# Patient Record
Sex: FEMALE | Race: WHITE | NOT HISPANIC OR LATINO | URBAN - NONMETROPOLITAN AREA
[De-identification: names, ages, dates, MRNs, and addresses within clinical notes are randomized per-mention and may not be internally consistent; named-entity substitution may affect disease eponyms.]

---

## 2018-04-20 PROBLEM — H01.024: Noted: 2018-10-22

## 2018-04-20 PROBLEM — H04.123: Noted: 2018-04-20

## 2018-04-20 PROBLEM — H01.021: Noted: 2018-10-22

## 2018-04-20 PROBLEM — H40.023: Noted: 2018-04-20

## 2018-04-20 PROBLEM — H43.811: Noted: 2018-04-20

## 2018-04-20 PROBLEM — Z96.1: Noted: 2018-04-20

## 2018-04-20 PROBLEM — E11.9: Noted: 2018-10-22

## 2019-04-22 ENCOUNTER — IMPORTED ENCOUNTER (OUTPATIENT)
Dept: URBAN - NONMETROPOLITAN AREA CLINIC 1 | Facility: CLINIC | Age: 74
End: 2019-04-22

## 2019-04-22 PROCEDURE — 92083 EXTENDED VISUAL FIELD XM: CPT

## 2019-04-22 PROCEDURE — 92014 COMPRE OPH EXAM EST PT 1/>: CPT

## 2019-04-22 NOTE — PATIENT DISCUSSION
Glaucoma suspect OU- Discussed findings of exam in detail with the patient. - IOP stable at 12 OU- Cup to Disc noted at 0.7 OU stable. [facility] previously done:   . 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 Family Hx of ARMD- Discussed diagnosis in detail with patient- Patient is stable at this time- No signs of ARMD seen on todays DFE exam- Patient may continue taking vitamins even though there is no proof that they will help with prevention- Continue to monitor Pseudophakia OU- Discussed exam findings with pt- s/p YAG PC OU- Continue to monitor Type II DM dx 2011- Discussed diagnosis in detail with patient- Stressed importance of good blood sugar control - Controlled with oral medication and diet. - Recommend no sodas- No diabetic retinopathy seen on todays exam- Letter to Dr. Yuridia Das- Continue to monitor PVD OD:  - Discussed findings of exam in detail with the patient. - Patient complaining of flashes of light off and on for the past several days. - Dilated exam showed no holes tears or detachments (flat 360). - The risk of retinal detachment in patients with PVDs was discussed with the patient and the warning signs of retinal detachment were carefully reviewed with the patient. - The patient was warned to return to the office or contact the ophthalmologist on call immediately if they experience signs of retinal detachment or changes in vision noted from today. - Continue to monitor closely. BECKI OU- Discussed diagnosis in detail with patient- Discussed signs and symptoms of progression- Recommend patient drinking plenty of water and starting Omega 3’s - Recommend Refresh or Systane  throughout the day- Consider Restasis or plugs in the future if no improvement- Continue to monitorOcular Allergies- Discussed diagnosis in detail with patient- Signs/symptoms associated discussed- Recommend Zaditor or Alaway OTC - Continue to monitor Blepharitis OU - Discussed diagnosis in detail with patient- Recommend patient using J & J baby shampoo to scrub lid daily- Continue to monitor; 's Notes: MR  4/22/19DFE  8/18/17OCT 10/22/18Optos 8/17/17VF 4/22/19Gonio 4/20/18PACH

## 2019-10-23 ENCOUNTER — IMPORTED ENCOUNTER (OUTPATIENT)
Dept: URBAN - NONMETROPOLITAN AREA CLINIC 1 | Facility: CLINIC | Age: 74
End: 2019-10-23

## 2019-10-23 PROCEDURE — 92014 COMPRE OPH EXAM EST PT 1/>: CPT

## 2019-10-23 PROCEDURE — 92133 CPTRZD OPH DX IMG PST SGM ON: CPT

## 2019-10-23 NOTE — PATIENT DISCUSSION
Glaucoma suspect OU- Discussed findings of exam in detail with the patient. - IOP stable at 15 OU - Cup to Disc noted at 0.7 OU stable. [facility] previously done:   . - VF done perviously OD shows Good field with imcomplete Quadranopsia from      stroke previously  - Gonio done previously: grade lV with light pigment OU- OCT done today shows no NFL thinning OU normal but OS shows small artifact  - Continue to monitor every 6 months or PRN. Family Hx of ARMD- Discussed diagnosis in detail with patient- Patient is stable at this time- No signs of ARMD seen on todays DFE exam- Patient may continue taking vitamins even though there is no proof that they will help with prevention- Continue to monitor Pseudophakia OU- Discussed exam findings with pt- s/p YAG PC OU- Continue to monitor Type II DM dx 2011- Discussed diagnosis in detail with patient- Stressed importance of good blood sugar control - Controlled with oral medication and diet. - Recommend no sodas- No diabetic retinopathy seen on todays exam-  Last A1C was 5.6 - Letter to Dr. Yaron Manrique- Continue to monitor PVD OD:  - Discussed findings of exam in detail with the patient. - Patient complaining of flashes of light off and on for the past several days. - Dilated exam showed no holes tears or detachments (flat 360). - The risk of retinal detachment in patients with PVDs was discussed with the patient and the warning signs of retinal detachment were carefully reviewed with the patient. - The patient was warned to return to the office or contact the ophthalmologist on call immediately if they experience signs of retinal detachment or changes in vision noted from today. - Continue to monitor closely. BECKI OU- Discussed diagnosis in detail with patient- Discussed signs and symptoms of progression- Recommend patient drinking plenty of water and starting Omega 3’s - Recommend Refresh or Systane  throughout the day- Consider Restasis or plugs in the future if no improvement- Continue to monitorOcular Allergies- Discussed diagnosis in detail with patient- Signs/symptoms associated discussed- Recommend Zaditor or Alaway OTC - Continue to monitor Blepharitis OU - Discussed diagnosis in detail with patient- Recommend patient using J & J baby shampoo to scrub lid daily- Continue to monitor; 's Notes: MR  4/22/19DFE  8/18/17OCT 10/23/19 Optos 8/17/17VF 4/22/19Gonio 4/20/18PACH

## 2020-04-22 ENCOUNTER — IMPORTED ENCOUNTER (OUTPATIENT)
Dept: URBAN - NONMETROPOLITAN AREA CLINIC 1 | Facility: CLINIC | Age: 75
End: 2020-04-22

## 2020-04-22 PROCEDURE — 92250 FUNDUS PHOTOGRAPHY W/I&R: CPT

## 2020-04-22 PROCEDURE — 99213 OFFICE O/P EST LOW 20 MIN: CPT

## 2020-04-22 NOTE — PATIENT DISCUSSION
Glaucoma suspect OU- Discussed findings of exam in detail with the patient. - IOP OD 16 and OS 15- Cup to Disc noted at 0.7 OU stable. [facility] previously done:   . 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 Family Hx of ARMD- Discussed diagnosis in detail with patient- Patient is stable at this time- No signs of ARMD seen on todays DFE exam- Patient may continue taking vitamins even though there is no proof that they will help with prevention- Continue to monitor Pseudophakia OU- Discussed exam findings with pt- s/p YAG PC OU- Continue to monitor Type II DM dx 2011- Discussed diagnosis in detail with patient- Stressed importance of good blood sugar control - Controlled with oral medication and diet. - Recommend no sodas- No diabetic retinopathy seen on todays exam-  Last A1C was 5.6 - Letter to Dr. Chaudhry Ask- Continue to monitor PVD OD:  - Discussed findings of exam in detail with the patient. - Patient complaining of flashes of light off and on for the past several days. - Dilated exam showed no holes tears or detachments (flat 360). - The risk of retinal detachment in patients with PVDs was discussed with the patient and the warning signs of retinal detachment were carefully reviewed with the patient. - The patient was warned to return to the office or contact the ophthalmologist on call immediately if they experience signs of retinal detachment or changes in vision noted from today. - Continue to monitor closely. BECKI OU- Discussed diagnosis in detail with patient- Discussed signs and symptoms of progression- Recommend patient drinking plenty of water and starting Omega 3’s - Recommend Refresh or Systane  throughout the day- Consider Restasis or plugs in the future if no improvement- Continue to monitorOcular Allergies- Discussed diagnosis in detail with patient- Signs/symptoms associated discussed- Recommend Pataday OTC- Patient has tried Zaditor and Alaway but didnt seen to help - Continue to monitor Blepharitis OU - Discussed diagnosis in detail with patient- Recommend patient using J & J baby shampoo to scrub lid daily- Continue to monitor; 's Notes: MR  4/22/19DFE  8/18/17OCT 10/23/19 Optos 4/22/20VF 4/22/19Gonio 4/20/18PACH

## 2020-10-22 ENCOUNTER — IMPORTED ENCOUNTER (OUTPATIENT)
Dept: URBAN - NONMETROPOLITAN AREA CLINIC 1 | Facility: CLINIC | Age: 75
End: 2020-10-22

## 2020-10-22 PROCEDURE — 92083 EXTENDED VISUAL FIELD XM: CPT

## 2020-10-22 PROCEDURE — 92014 COMPRE OPH EXAM EST PT 1/>: CPT

## 2020-10-22 NOTE — PATIENT DISCUSSION
Glaucoma suspect OU- Discussed findings of exam in detail with the patient. - IOP 17 OU- Cup to Disc noted at 0.7 OU stable. [facility] previously done:   . 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   - Gonio done previously: grade lV with light pigment OU- OCT done previously shows no NFL thinning OU normal but OS shows small artifact- Optos done previolusy stable OU   - Continue to monitor  Family Hx of ARMD- Discussed diagnosis in detail with patient- No signs of ARMD seen on todays DFE exam- Patient may continue taking vitamins even though there is no proof that they will help with prevention- Continue to monitor Pseudophakia OU- Discussed exam findings with pt- s/p YAG PC OU- Continue to monitor Type II DM dx 2011- Discussed diagnosis in detail with patient- Stressed importance of good blood sugar control - Controlled with oral medication and diet. - Recommend no sodas- No diabetic retinopathy seen on todays exam-  Last A1C was 5.6 - Letter to Dr. Yuridia Das- Continue to monitor PVD OD:  - Discussed findings of exam in detail with the patient. - Patient complaining of flashes of light off and on for the past several days. - Dilated exam showed no holes tears or detachments (flat 360). - The risk of retinal detachment in patients with PVDs was discussed with the patient and the warning signs of retinal detachment were carefully reviewed with the patient. - The patient was warned to return to the office or contact the ophthalmologist on call immediately if they experience signs of retinal detachment or changes in vision noted from today. - Continue to monitor closely. BECKI OU- Discussed diagnosis in detail with patient- Discussed signs and symptoms of progression- Recommend patient drinking plenty of water and starting Omega 3’s - Recommend Refresh or Systane  throughout the day- Consider Restasis or plugs in the future if no improvement- Continue to monitorOcular Allergies- Discussed diagnosis in detail with patient- Signs/symptoms associated discussed- Recommend Pataday OTC- Patient has tried Zaditor and Alaway but didnt seen to help - Samples of Bepreve and Zerviate given today - Continue to monitor Blepharitis OU - Discussed diagnosis in detail with patient- Recommend patient using J & J baby shampoo to scrub lid daily- Continue to monitor; 's Notes: MR  4/22/19DFE  8/18/17OCT 10/23/19 Optos 4/22/20VF 4/22/19Gonio 4/20/18PACH

## 2021-05-10 ENCOUNTER — IMPORTED ENCOUNTER (OUTPATIENT)
Dept: URBAN - NONMETROPOLITAN AREA CLINIC 1 | Facility: CLINIC | Age: 76
End: 2021-05-10

## 2021-05-10 ENCOUNTER — PREPPED CHART (OUTPATIENT)
Dept: URBAN - NONMETROPOLITAN AREA CLINIC 1 | Facility: CLINIC | Age: 76
End: 2021-05-10

## 2021-05-10 PROCEDURE — 99214 OFFICE O/P EST MOD 30 MIN: CPT

## 2021-05-10 PROCEDURE — 92015 DETERMINE REFRACTIVE STATE: CPT

## 2021-05-10 NOTE — PATIENT DISCUSSION
Discussed diagnosis in detail with patient. S/P YAG PC OU good central opening OU. Will continue to monitor.

## 2021-05-10 NOTE — PATIENT DISCUSSION
Discussed diagnosis in detail with patient. IOP 15 OU. Cup to Disc noted at .7 OU. PACHY done previously  and . VF done previously shows Bilateral Incomplete Superior Quadranopsia. Gonio done previously grade IV with light pigment OU. OCT done today Shows No NFL thinning OU. Optos done previously stable OU. Continue to monitor.

## 2021-05-10 NOTE — PATIENT DISCUSSION
Discussed diagnosis in detail with patient. Diagnosed since 2011. Stressed importance of good blood sugar control. Controlled with oral medication. Recommend no sodas. Patient reorts last A1C was 5.5 . No diabetic retinopathy seen on today's exam.  Notes to Dr. Jahaira Soto. Continue to monitor.

## 2021-05-10 NOTE — PATIENT DISCUSSION
Glaucoma suspect OU- Discussed findings of exam in detail with the patient. - IOP 15 OU- Cup to Disc noted at 0.7 OU stable. [facility] previously done:   . 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   - Gonio done previously: grade lV with light pigment OU- OCT done today shows no NFL thinning OU- Optos done previolusy stable OU   - Continue to monitor  Family Hx of ARMD- Discussed diagnosis in detail with patient- No signs of ARMD seen on todays DFE exam- Patient may continue taking vitamins even though there is no proof that they will help with prevention- Continue to monitor Pseudophakia OU- Discussed exam findings with pt- s/p YAG PC OU- Continue to monitor Type II DM dx 2011- Discussed diagnosis in detail with patient- Stressed importance of good blood sugar control - Controlled with oral medication and diet. - Recommend no sodas- No diabetic retinopathy seen on todays exam-  Last A1C was 5.5- Letter to Dr. Anish Garcia- Continue to monitor PVD OD:  - Discussed findings of exam in detail with the patient. - Patient complaining of flashes of light off and on for the past several days. - Dilated exam showed no holes tears or detachments (flat 360). - The risk of retinal detachment in patients with PVDs was discussed with the patient and the warning signs of retinal detachment were carefully reviewed with the patient. - The patient was warned to return to the office or contact the ophthalmologist on call immediately if they experience signs of retinal detachment or changes in vision noted from today. - Continue to monitor closely. BECKI OU- Discussed diagnosis in detail with patient- Discussed signs and symptoms of progression- Recommend patient drinking plenty of water and starting Omega 3’s - Recommend Refresh or Systane  throughout the day- Consider Restasis or plugs in the future if no improvement- Continue to monitorOcular Allergies- Discussed diagnosis in detail with patient- Signs/symptoms associated discussed- Recommend Pataday OTC- Patient has tried Zaditor and Alaway but didnt seen to help - Samples of Bepreve and Zerviate given today - Continue to monitor Blepharitis OU - Discussed diagnosis in detail with patient- Recommend patient using J & J baby shampoo to scrub lid daily- Continue to monitorPresbyopia OU - Discussed diagnosis in detail with patient - New glasses RX given today- Continue to monitor; Dr's Notes: MR  5/10/21DFE  8/18/17OCT 5/10/21Optos 4/22/20VF 4/22/19[place]o 4/20/18PACH

## 2021-05-10 NOTE — PATIENT DISCUSSION
Patient complaining of flashes of light on and off for the past several of days. (Without Tear) No holes, tears or detachments. Patient cautioned to call our office immediately if they experience a substantial change in their symptoms such as an increase in floaters, persistent flashes, loss of visual field (may appear as a shadow or a curtain) or decrease in visual acuity as these may indicate a retinal tear or detachment.

## 2021-05-10 NOTE — PATIENT DISCUSSION
Discussed diagnosis in detail with patient. Recommend drinking plenty of water and taking Omega 3's. Recommend Refresh or Syastane through out the day. Consider Restasis or plugs in the future if no improvement. Recommend J&J baby shampoo to scub lids daily. Continue to monitor.

## 2022-01-23 ASSESSMENT — VISUAL ACUITY
OS_CC: 20/30
OD_CC: 20/30

## 2022-01-23 ASSESSMENT — TONOMETRY
OD_IOP_MMHG: 15
OS_IOP_MMHG: 15

## 2022-01-27 ENCOUNTER — FOLLOW UP (OUTPATIENT)
Dept: URBAN - NONMETROPOLITAN AREA CLINIC 1 | Facility: CLINIC | Age: 77
End: 2022-01-27

## 2022-01-27 DIAGNOSIS — H40.023: ICD-10-CM

## 2022-01-27 PROCEDURE — 99213 OFFICE O/P EST LOW 20 MIN: CPT

## 2022-01-27 ASSESSMENT — TONOMETRY
OS_IOP_MMHG: 16
OD_IOP_MMHG: 16

## 2022-01-27 ASSESSMENT — VISUAL ACUITY
OS_SC: 20/25
OD_SC: 20/30-1

## 2022-01-27 NOTE — PATIENT DISCUSSION
Discussed diagnosis in detail with patient. Diagnosed since 2011. Stressed importance of good blood sugar control. Controlled with oral medication. Recommend no sodas. No diabetic retinopathy seen on today's exam.  Notes to Dr. Barfield Left. Continue to monitor.

## 2022-04-09 ASSESSMENT — PACHYMETRY
OD_CT_UM: 574; ADJ: WNL
OS_CT_UM: 563; ADJ: WNL
OD_CT_UM: 574; ADJ: WNL
OS_CT_UM: 563; ADJ: WNL
OS_CT_UM: 563; ADJ: WNL
OD_CT_UM: 574; ADJ: WNL
OS_CT_UM: 563; ADJ: WNL
OS_CT_UM: 563; ADJ: WNL

## 2022-04-09 ASSESSMENT — TONOMETRY
OD_IOP_MMHG: 15
OS_IOP_MMHG: 17
OS_IOP_MMHG: 15
OD_IOP_MMHG: 17
OS_IOP_MMHG: 12
OD_IOP_MMHG: 15
OD_IOP_MMHG: 12
OD_IOP_MMHG: 16

## 2022-04-09 ASSESSMENT — VISUAL ACUITY
OS_SC: 20/20
OS_SC: 20/25
OD_SC: 20/20-
OS_SC: 20/30
OS_CC: J1+
OS_CC: 20/20
OD_SC: 20/30
OD_CC: J1+
OD_SC: 20/20
OD_SC: 20/20-
OS_SC: 20/20
OD_CC: 20/30

## 2022-08-02 ENCOUNTER — FOLLOW UP (OUTPATIENT)
Dept: URBAN - NONMETROPOLITAN AREA CLINIC 1 | Facility: CLINIC | Age: 77
End: 2022-08-02

## 2022-08-02 DIAGNOSIS — H40.013: ICD-10-CM

## 2022-08-02 PROCEDURE — 99214 OFFICE O/P EST MOD 30 MIN: CPT

## 2022-08-02 PROCEDURE — 92133 CPTRZD OPH DX IMG PST SGM ON: CPT

## 2022-08-02 PROCEDURE — 92083 EXTENDED VISUAL FIELD XM: CPT

## 2022-08-02 ASSESSMENT — VISUAL ACUITY
OS_CC: 20/30+2
OD_CC: 20/30-

## 2022-08-02 ASSESSMENT — TONOMETRY
OS_IOP_MMHG: 16
OD_IOP_MMHG: 16

## 2022-08-02 NOTE — PATIENT DISCUSSION
Discussed diagnosis in detail with patient. Diagnosed since 2011. Stressed importance of good blood sugar control. Controlled with oral medication. Recommend no sodas. No diabetic retinopathy seen on today's exam. Notes to Dr. Mariam Clifton. Continue to monitor.

## 2023-08-04 ENCOUNTER — FOLLOW UP (OUTPATIENT)
Dept: URBAN - NONMETROPOLITAN AREA CLINIC 1 | Facility: CLINIC | Age: 78
End: 2023-08-04

## 2023-08-04 DIAGNOSIS — E11.9: ICD-10-CM

## 2023-08-04 DIAGNOSIS — I69.898: ICD-10-CM

## 2023-08-04 DIAGNOSIS — H40.013: ICD-10-CM

## 2023-08-04 PROCEDURE — 92134 CPTRZ OPH DX IMG PST SGM RTA: CPT

## 2023-08-04 PROCEDURE — 92083 EXTENDED VISUAL FIELD XM: CPT

## 2023-08-04 PROCEDURE — 99214 OFFICE O/P EST MOD 30 MIN: CPT

## 2023-08-04 PROCEDURE — 92133 CPTRZD OPH DX IMG PST SGM ON: CPT

## 2023-08-04 ASSESSMENT — TONOMETRY
OS_IOP_MMHG: 15
OD_IOP_MMHG: 15

## 2023-08-04 ASSESSMENT — VISUAL ACUITY
OD_CC: 20/29-1
OU_CC: 20/20-2
OS_CC: 20/20-2

## 2024-02-09 ENCOUNTER — FOLLOW UP (OUTPATIENT)
Dept: URBAN - NONMETROPOLITAN AREA CLINIC 1 | Facility: CLINIC | Age: 79
End: 2024-02-09

## 2024-02-09 DIAGNOSIS — H52.4: ICD-10-CM

## 2024-02-09 DIAGNOSIS — H40.013: ICD-10-CM

## 2024-02-09 PROCEDURE — 99213 OFFICE O/P EST LOW 20 MIN: CPT

## 2024-02-09 PROCEDURE — 92015 DETERMINE REFRACTIVE STATE: CPT

## 2024-02-09 PROCEDURE — 92250 FUNDUS PHOTOGRAPHY W/I&R: CPT

## 2024-02-09 ASSESSMENT — VISUAL ACUITY
OS_CC: 20/25-2
OD_CC: 20/25-2

## 2024-02-09 ASSESSMENT — TONOMETRY
OD_IOP_MMHG: 15
OS_IOP_MMHG: 15